# Patient Record
Sex: MALE | Race: WHITE | NOT HISPANIC OR LATINO | Employment: FULL TIME | URBAN - METROPOLITAN AREA
[De-identification: names, ages, dates, MRNs, and addresses within clinical notes are randomized per-mention and may not be internally consistent; named-entity substitution may affect disease eponyms.]

---

## 2022-07-16 ENCOUNTER — HOSPITAL ENCOUNTER (EMERGENCY)
Facility: HOSPITAL | Age: 57
Discharge: HOME | End: 2022-07-16
Attending: EMERGENCY MEDICINE | Admitting: EMERGENCY MEDICINE
Payer: COMMERCIAL

## 2022-07-16 ENCOUNTER — APPOINTMENT (EMERGENCY)
Dept: RADIOLOGY | Facility: HOSPITAL | Age: 57
End: 2022-07-16
Payer: COMMERCIAL

## 2022-07-16 VITALS
TEMPERATURE: 98.8 F | SYSTOLIC BLOOD PRESSURE: 143 MMHG | DIASTOLIC BLOOD PRESSURE: 83 MMHG | RESPIRATION RATE: 18 BRPM | OXYGEN SATURATION: 95 % | HEART RATE: 69 BPM | WEIGHT: 185 LBS

## 2022-07-16 DIAGNOSIS — H11.32 SUBCONJUNCTIVAL HEMORRHAGE OF LEFT EYE: ICD-10-CM

## 2022-07-16 DIAGNOSIS — S01.21XA LACERATION OF NOSE, INITIAL ENCOUNTER: ICD-10-CM

## 2022-07-16 DIAGNOSIS — S05.01XA BILATERAL CORNEAL ABRASIONS, INITIAL ENCOUNTER: ICD-10-CM

## 2022-07-16 DIAGNOSIS — S02.2XXB OPEN FRACTURE OF NASAL BONE, INITIAL ENCOUNTER: Primary | ICD-10-CM

## 2022-07-16 DIAGNOSIS — H05.232 PERIORBITAL HEMATOMA OF LEFT EYE: ICD-10-CM

## 2022-07-16 DIAGNOSIS — S09.93XA FACIAL TRAUMA, INITIAL ENCOUNTER: ICD-10-CM

## 2022-07-16 DIAGNOSIS — R03.0 ELEVATED BLOOD PRESSURE READING: ICD-10-CM

## 2022-07-16 DIAGNOSIS — H20.9 TRAUMATIC IRITIS: ICD-10-CM

## 2022-07-16 DIAGNOSIS — H57.12 ACUTE LEFT EYE PAIN: ICD-10-CM

## 2022-07-16 DIAGNOSIS — S05.02XA BILATERAL CORNEAL ABRASIONS, INITIAL ENCOUNTER: ICD-10-CM

## 2022-07-16 DIAGNOSIS — S06.0XAA CONCUSSION: ICD-10-CM

## 2022-07-16 PROCEDURE — 90471 IMMUNIZATION ADMIN: CPT

## 2022-07-16 PROCEDURE — 99284 EMERGENCY DEPT VISIT MOD MDM: CPT

## 2022-07-16 PROCEDURE — G1004 CDSM NDSC: HCPCS

## 2022-07-16 PROCEDURE — 96365 THER/PROPH/DIAG IV INF INIT: CPT

## 2022-07-16 PROCEDURE — 12011 RPR F/E/E/N/L/M 2.5 CM/<: CPT | Performed by: EMERGENCY MEDICINE

## 2022-07-16 PROCEDURE — 90715 TDAP VACCINE 7 YRS/> IM: CPT | Performed by: EMERGENCY MEDICINE

## 2022-07-16 PROCEDURE — 99284 EMERGENCY DEPT VISIT MOD MDM: CPT | Performed by: EMERGENCY MEDICINE

## 2022-07-16 PROCEDURE — 70450 CT HEAD/BRAIN W/O DYE: CPT

## 2022-07-16 RX ORDER — LIDOCAINE HYDROCHLORIDE 10 MG/ML
5 INJECTION, SOLUTION EPIDURAL; INFILTRATION; INTRACAUDAL; PERINEURAL ONCE
Status: COMPLETED | OUTPATIENT
Start: 2022-07-16 | End: 2022-07-16

## 2022-07-16 RX ORDER — CEPHALEXIN 500 MG/1
500 CAPSULE ORAL EVERY 6 HOURS SCHEDULED
Qty: 20 CAPSULE | Refills: 0 | Status: SHIPPED | OUTPATIENT
Start: 2022-07-16 | End: 2022-07-21

## 2022-07-16 RX ORDER — TOBRAMYCIN AND DEXAMETHASONE 3; 1 MG/ML; MG/ML
1 SUSPENSION/ DROPS OPHTHALMIC 3 TIMES DAILY
Status: DISCONTINUED | OUTPATIENT
Start: 2022-07-16 | End: 2022-07-16 | Stop reason: HOSPADM

## 2022-07-16 RX ORDER — TETRACAINE HYDROCHLORIDE 5 MG/ML
1 SOLUTION OPHTHALMIC ONCE
Status: COMPLETED | OUTPATIENT
Start: 2022-07-16 | End: 2022-07-16

## 2022-07-16 RX ORDER — CEFAZOLIN SODIUM 2 G/50ML
2000 SOLUTION INTRAVENOUS ONCE
Status: COMPLETED | OUTPATIENT
Start: 2022-07-16 | End: 2022-07-16

## 2022-07-16 RX ORDER — OFLOXACIN 3 MG/ML
1 SOLUTION/ DROPS OPHTHALMIC
Status: DISCONTINUED | OUTPATIENT
Start: 2022-07-16 | End: 2022-07-16

## 2022-07-16 RX ADMIN — CEFAZOLIN SODIUM 2000 MG: 2 SOLUTION INTRAVENOUS at 14:39

## 2022-07-16 RX ADMIN — FLUORESCEIN SODIUM 1 STRIP: 1 STRIP OPHTHALMIC at 12:44

## 2022-07-16 RX ADMIN — TOBRAMYCIN AND DEXAMETHASONE 1 DROP: 3; 1 SUSPENSION/ DROPS OPHTHALMIC at 17:40

## 2022-07-16 RX ADMIN — TETANUS TOXOID, REDUCED DIPHTHERIA TOXOID AND ACELLULAR PERTUSSIS VACCINE, ADSORBED 0.5 ML: 5; 2.5; 8; 8; 2.5 SUSPENSION INTRAMUSCULAR at 13:24

## 2022-07-16 RX ADMIN — TETRACAINE HYDROCHLORIDE 1 DROP: 5 SOLUTION OPHTHALMIC at 12:44

## 2022-07-16 RX ADMIN — LIDOCAINE HYDROCHLORIDE 5 ML: 10 INJECTION, SOLUTION EPIDURAL; INFILTRATION; INTRACAUDAL at 14:56

## 2022-07-16 RX ADMIN — OFLOXACIN 1 DROP: 3 SOLUTION OPHTHALMIC at 15:18

## 2022-07-16 NOTE — CONSULTS
This 62-year-old gentleman presents complaining of pain in the left eye  He was walking near a lacrosse field and was struck in the left eye with a lacrosse ball while wearing sunglasses  Past ocular history is unremarkable  On examination, visual acuity without correction at near is 20/70 both eyes  Pupils are equal round and reactive to light with no afferent defect  Extraocular movements are unrestricted  The external examination reveals periorbital ecchymosis and edema with some dried blood around the medial canthal area  The cornea appears clear to me  There is subconjunctival hemorrhage noted nasally  The anterior chamber is formed  The intra-ocular pressure is 17  The left eye was dilated with Mydriacyl and Craig-Synephrine at 4:15 p m       The left lens is clear  The vitreous cavity is clear  The optic nerve is pink and flat with 0 3 physiologic cupping  The macula, vessels and periphery are unremarkable  Impression:  Traumatic iritis left eye, subconjunctival hemorrhage left eye, periorbital hematoma left eye, nasal fracture  Plan:  Topical antibiotic steroid drops 3 times a day for 5 days  The patient should have a follow-up examination in his home town in approximately 3 months  Explained the risk of angle recession glaucoma and retinal detachment in some detail

## 2022-07-16 NOTE — ED ATTENDING ATTESTATION
7/16/2022  IIsela MD, saw and evaluated the patient  I have discussed the patient with the resident/non-physician practitioner and agree with the resident's/non-physician practitioner's findings, Plan of Care, and MDM as documented in the resident's/non-physician practitioner's note, except where noted  All available labs and Radiology studies were reviewed  I was present for key portions of any procedure(s) performed by the resident/non-physician practitioner and I was immediately available to provide assistance  At this point I agree with the current assessment done in the Emergency Department  I have conducted an independent evaluation of this patient a history and physical is as follows:  Patient was at a lacrosse tournament, took a lacrosse ball to his left eye  No loss of consciousness  No double or blurry vision  Patient complains of pain and swelling to the eye and nose  Patient is not on blood thinners  On exam patient with bony deformity of his nose, large laceration, pupil is not reactive on that side, extraocular movements are intact without pain and no obvious entrapment  Patient does not have significant tenderness over his zygoma  Impression:  Drip trauma to the globe  No Dasha sign    Will plan to CT orbits and face  ED Course         Critical Care Time  Procedures

## 2022-07-16 NOTE — DISCHARGE INSTRUCTIONS
You were evaluated in the emergency department for: facial trauma  You can access your current and pending results through [facility] Gutierrez  A radiologist will take a second look at your X-Rays, if you had any, and you will be contacted with any new findings  You should follow-up with your primary care provider, as soon as possible, for re-evaluation  You have also been referred to ENT/ophthalmology new should follow-up with them as well  You should get the suture removed in 5-7 days  As discussed, this will scar  Your workup revealed no emergent features at this time; however, many disease processes are dynamic:    Please, return to the emergency department if you experience new or worsening symptoms, fever, chest pain, shortness of breath, difficulty breathing, dizziness, abdominal pain, persistent nausea/vomiting, syncope or passing out, blood in your urine or stool, coughing up blood, leg swelling/pain, urinary retention, bowel or bladder incontinence, numbness between your legs  Additionally, your blood pressure was measured to be high  This is something that you should discuss with your primary care provider and have re-checked within one week

## 2022-07-16 NOTE — ED PROVIDER NOTES
History  Chief Complaint   Patient presents with    Eye Pain     Pt walking across sports field and was hit in the left eye with a lacrosse ball, no LOC, no thinners, vision is slightly blurred, with significant swelling, lac noted to the bridge of the nose, subconjunctival hemmorhage noted left eye, pupils 3 brisk bilat     Patient is a 58-year-old male, with no pertinent medical history and not on anti thrombotic medication, who presents to the ED today due to head strike with lacrosse ball to the left of the nasal bridge  Patient reports a mild intensity discomfort characterized as soreness in this area  There is associated swelling around the left eye as well as redness of the eye  There is no associated vision change, photophobia, pain with extraocular movements  There was no associated loss of consciousness from the event  Patient denies injury anywhere else  Patient has applied ice to remit his symptoms  Touch exacerbates his discomfort  Patient is without other concerns at this time     - No language barrier    - History obtained from patient  - There are no limitations to the history obtained  - Previous charting was reviewed          None       Past Medical History:   Diagnosis Date    Hypercholesteremia        History reviewed  No pertinent surgical history  History reviewed  No pertinent family history  I have reviewed and agree with the history as documented  E-Cigarette/Vaping    E-Cigarette Use Never User      E-Cigarette/Vaping Substances    Nicotine No     THC No     CBD No     Flavoring No     Other No     Unknown No      Social History     Tobacco Use    Smoking status: Never Smoker   Vaping Use    Vaping Use: Never used   Substance Use Topics    Alcohol use: Not Currently    Drug use: Not Currently        Review of Systems   Constitutional: Negative for fever  HENT: Negative for trouble swallowing  Eyes: Positive for pain   Negative for photophobia and visual disturbance  Respiratory: Negative for shortness of breath  Cardiovascular: Negative for chest pain  Gastrointestinal: Negative for abdominal pain and vomiting  Endocrine: Negative for polyuria  Genitourinary: Negative for dysuria  Musculoskeletal: Negative for gait problem  Skin: Negative for rash  Allergic/Immunologic: Negative for environmental allergies  Neurological: Negative for weakness and numbness  Hematological: Negative for adenopathy  Psychiatric/Behavioral: Negative for confusion  All other systems reviewed and are negative  Physical Exam  ED Triage Vitals [07/16/22 1239]   Temperature Pulse Respirations Blood Pressure SpO2   98 8 °F (37 1 °C) 72 18 151/77 97 %      Temp Source Heart Rate Source Patient Position - Orthostatic VS BP Location FiO2 (%)   Oral Monitor Lying Right arm --      Pain Score       5             Orthostatic Vital Signs  Vitals:    07/16/22 1239 07/16/22 1304 07/16/22 1441   BP: 151/77 145/84 143/83   Pulse: 72 72 69   Patient Position - Orthostatic VS: Lying Lying Lying       Physical Exam  Vitals and nursing note reviewed  Constitutional:       General: He is not in acute distress  Appearance: He is not ill-appearing, toxic-appearing or diaphoretic  HENT:      Head: Normocephalic  Comments: No Rivero sign, left raccoon eye    Tenderness to palpation of the left maxillary region    Laceration on left bridge of nose     No facial instability, jaw malocclusion     Right Ear: External ear normal       Left Ear: External ear normal       Nose: Nose normal  No congestion or rhinorrhea  Comments: No septal hematoma bilaterally     Mouth/Throat:      Mouth: Mucous membranes are moist       Pharynx: Oropharynx is clear  No oropharyngeal exudate or posterior oropharyngeal erythema  Eyes:      General:         Right eye: No discharge  Left eye: No discharge  Extraocular Movements: Extraocular movements intact        Comments: OD: 14 IOP  20/25 vision  No dendrites  Small corneal abrasion  No denia's sign  No hypopyon, no hyphema    OS: 14 IOP  20/25 vision  No dendrites  Multiple small corneal abrasions  No denia's sign  Pupil slightly mydriatic - does not react to light to either direct or consensual response  OD has consensual response with light in OS  Subconjunctival hemorrhage  No hypopyon, no hyphema    Left eye pain does not resolve with application of topical analgesia     No pain with extraocular movements   Neck:      Comments: Patient is spontaneously rotating their neck to the left and right during the history and physical exam interaction without difficulty or apparent discomfort      No C spine tenderness  Cardiovascular:      Rate and Rhythm: Normal rate and regular rhythm  Heart sounds: Normal heart sounds  No murmur heard  No friction rub  No gallop  Comments: 2+ Radial  Pulmonary:      Effort: Pulmonary effort is normal  No respiratory distress  Breath sounds: Normal breath sounds  No stridor  No wheezing, rhonchi or rales  Abdominal:      General: Abdomen is flat  There is no distension  Palpations: Abdomen is soft  Tenderness: There is no abdominal tenderness  There is no right CVA tenderness, left CVA tenderness, guarding or rebound  Musculoskeletal:         General: No deformity  Cervical back: Neck supple  No tenderness  No muscular tenderness  Lymphadenopathy:      Cervical: No cervical adenopathy  Skin:     General: Skin is warm and dry  Capillary Refill: Capillary refill takes less than 2 seconds  Neurological:      Mental Status: He is alert  Comments: Patient is speaking clearly in complete sentences  Patient is answering appropriately and able follow commands  Patient is moving all four extremities spontaneously  No facial droop  Tongue midline  Patient able to ambulate without difficulty         Psychiatric:         Mood and Affect: Mood normal  Behavior: Behavior normal          ED Medications  Medications   tobramycin-dexamethasone (TOBRADEX) 0 3-0 1 % ophthalmic suspension 1 drop (has no administration in time range)   fluorescein sodium sterile ophthalmic strip 1 strip (1 strip Right Eye Given 7/16/22 1244)   tetracaine 0 5 % ophthalmic solution 1 drop (1 drop Right Eye Given 7/16/22 1244)   tetanus-diphtheria-acellular pertussis (BOOSTRIX) IM injection 0 5 mL (0 5 mL Intramuscular Given 7/16/22 1324)   ceFAZolin (ANCEF) IVPB (premix in dextrose) 2,000 mg 50 mL (0 mg Intravenous Stopped 7/16/22 1518)   lidocaine (PF) (XYLOCAINE-MPF) 1 % injection 5 mL (5 mL Infiltration Given 7/16/22 8546)       Diagnostic Studies  Results Reviewed     None                 CT head without contrast   Final Result by Guilherme Kwan DO (07/16 1955)   Addendum 1 of 1 by Jaciel Chavez DO (07/16 1354)   ADDENDUM:      Possible nondisplaced left nasal bone fracture  See separate CT orbits  Final      Soft tissue swelling is identified superficial to the left nose and extending into the medial canthus and preseptal soft tissues  No underlying fracture or evidence of retrobulbar hematoma  No acute intracranial abnormality  Workstation performed: [id number]         CT orbits/temporal bones/skull base wo contrast   Final Result by Guilherme Kwan DO (07/16 8355)      Acute buckle fracture of the left nasal bone  There is overlying soft tissue swelling involving the nose and medial preseptal soft tissues  Workstation performed: [id number]               Procedures  Laceration repair    Date/Time: 7/16/2022 3:29 PM  Performed by: Tory Coleman MD  Authorized by: Tory Coleman MD   Consent: Verbal consent obtained    Risks and benefits: risks, benefits and alternatives were discussed  Patient identity confirmed: verbally with patient  Time out: Immediately prior to procedure a "time out" was called to verify the correct patient, procedure, equipment, support staff and site/side marked as required  Body area: head/neck  Location details: nose  Laceration length: 0 5 cm  Tendon involvement: none  Nerve involvement: none  Anesthesia: local infiltration    Anesthesia:  Local Anesthetic: lidocaine 1% without epinephrine  Anesthetic total: 1 mL    Sedation:  Patient sedated: no      Wound Dehiscence:  Superficial Wound Dehiscence: simple closure      Procedure Details:  Irrigation solution: saline  Irrigation method: syringe  Amount of cleaning: standard  Debridement: none  Degree of undermining: none  Skin closure: 5-0 Prolene  Number of sutures: 1  Technique: simple  Approximation: close  Approximation difficulty: simple  Patient tolerance: patient tolerated the procedure well with no immediate complications            ED Course  ED Course as of 07/16/22 1711   Sat Jul 16, 2022   1710 Patient and his wife had questions about medication dosing and follow-up  These were answered to their satisfaction  Patient and his wife have no further questions or concerns after receiving discharge instructions and return precautions                                        MDM  Number of Diagnoses or Management Options  Acute left eye pain  Bilateral corneal abrasions, initial encounter  Concussion  Elevated blood pressure reading  Facial trauma, initial encounter  Laceration of nose, initial encounter  Open fracture of nasal bone, initial encounter  Periorbital hematoma of left eye  Subconjunctival hemorrhage of left eye  Traumatic iritis  Diagnosis management comments: Patient is a 58-year-old male, with no pertinent medical history and not on anti thrombotic medication, who presents to the ED today due to head strike with lacrosse ball to the left of the nasal bridge  Patient reports a mild intensity discomfort characterized as soreness in this area    There is associated swelling around the left eye as well as redness of the eye   There is no associated vision change, photophobia, pain with extraocular movements  Patient is currently afebrile and hemodynamically stable  His physical exam is notable for laceration on the left nasal bridge, nasal deformity, subconjunctival hemorrhage, corneal abrasions, raccoon eye left  This presentation is concerning for:  Facial fracture, ICH, concussion, open fracture  I also considered retrobulbar hematoma, traumatic iritis  Low clinical suspicion for globe rupture based upon physical exam   Will investigate with CT orbits, CT head  Eye shield placed on patient prior to CT imaging  Will manage with primary closure of laceration, ofloxacin drops  Patient does not desire analgesia at this time  Disposition  Final diagnoses:   Facial trauma, initial encounter   Bilateral corneal abrasions, initial encounter   Elevated blood pressure reading   Laceration of nose, initial encounter   Acute left eye pain   Open fracture of nasal bone, initial encounter   Subconjunctival hemorrhage of left eye   Traumatic iritis   Concussion   Periorbital hematoma of left eye     Time reflects when diagnosis was documented in both MDM as applicable and the Disposition within this note     Time User Action Codes Description Comment    7/16/2022  1:06 PM Georges Agee A Add [S09 93XA] Facial trauma, initial encounter     7/16/2022  1:06 PM Georges Agee Add [S05  01XA,  S05 02XA] Bilateral corneal abrasions, initial encounter     7/16/2022  1:06 PM Legare, Angelika Muckle A Add [R03 0] Elevated blood pressure reading     7/16/2022  1:06 PM Legare, Angelika Muckle A Add [S01 21XA] Laceration of nose, initial encounter     7/16/2022  1:06 PM Georges Agee A Add [Q87 63] Acute left eye pain     7/16/2022  2:02 PM Legare, Angelika Muckle A Add [S02  2XXB] Open fracture of nasal bone, initial encounter     7/16/2022  2:02 PM Georges Agee Modify [X70 89DU] Facial trauma, initial encounter     7/16/2022 2:02 PM Gracie Shaker A Modify [S02  2XXB] Open fracture of nasal bone, initial encounter     7/16/2022  2:07 PM Gracie Shaker A Add [H11 32] Subconjunctival hemorrhage of left eye     7/16/2022  2:53 PM Gracie Shaker A Add [H20 9] Traumatic iritis     7/16/2022  2:55 PM Gracie Shaker A Add [S06 0X9A] Concussion     7/16/2022  4:59 PM Gracie Shaker Add [S21 667] Periorbital hematoma of left eye       ED Disposition     ED Disposition   Discharge    Condition   Stable    Date/Time   Sat Jul 16, 2022  4:59 PM    Comment   David Calzada discharge to home/self care  Follow-up Information     Follow up With Specialties Details Why Contact Info Additional Information    Ksenia Paige DO Family Medicine Schedule an appointment as soon as possible for a visit   [address] ENT Otolaryngology Schedule an appointment as soon as possible for a visit   [address] [facility] [id number]  [place] [address] [facility], [address] [place] [id number] [phone]    Nazia Delgado MD Ophthalmology Schedule an appointment as soon as possible for a visit   [facility] [address]  [address] [place] [address]  [phone]             Patient's Medications   Discharge Prescriptions    CEPHALEXIN (KEFLEX) 500 MG CAPSULE    Take 1 capsule (500 mg total) by mouth every 6 (six) hours for 5 days       Start Date: 7/16/2022 End Date: 7/21/2022       Order Dose: 500 mg       Quantity: 20 capsule    Refills: 0         PDMP Review     None           ED Provider  Attending physically available and evaluated David Calzada I managed the patient along with the ED Attending      Electronically Signed by         Pascale Coronado MD  07/16/22 4434